# Patient Record
Sex: MALE | Race: WHITE | Employment: FULL TIME | URBAN - METROPOLITAN AREA
[De-identification: names, ages, dates, MRNs, and addresses within clinical notes are randomized per-mention and may not be internally consistent; named-entity substitution may affect disease eponyms.]

---

## 2020-09-15 ENCOUNTER — ANCILLARY PROCEDURE (OUTPATIENT)
Dept: GENERAL RADIOLOGY | Facility: CLINIC | Age: 60
End: 2020-09-15
Attending: FAMILY MEDICINE
Payer: COMMERCIAL

## 2020-09-15 ENCOUNTER — OFFICE VISIT (OUTPATIENT)
Dept: FAMILY MEDICINE | Facility: CLINIC | Age: 60
End: 2020-09-15
Payer: COMMERCIAL

## 2020-09-15 VITALS
RESPIRATION RATE: 14 BRPM | HEIGHT: 69 IN | DIASTOLIC BLOOD PRESSURE: 74 MMHG | WEIGHT: 174 LBS | SYSTOLIC BLOOD PRESSURE: 106 MMHG | BODY MASS INDEX: 25.77 KG/M2 | OXYGEN SATURATION: 98 % | HEART RATE: 59 BPM | TEMPERATURE: 96.8 F

## 2020-09-15 DIAGNOSIS — G89.29 CHRONIC RIGHT SHOULDER PAIN: ICD-10-CM

## 2020-09-15 DIAGNOSIS — Z00.00 ROUTINE GENERAL MEDICAL EXAMINATION AT A HEALTH CARE FACILITY: Primary | ICD-10-CM

## 2020-09-15 DIAGNOSIS — Z11.59 NEED FOR HEPATITIS C SCREENING TEST: ICD-10-CM

## 2020-09-15 DIAGNOSIS — Z80.42 FAMILY HISTORY OF PROSTATE CANCER: ICD-10-CM

## 2020-09-15 DIAGNOSIS — Z12.5 SCREENING FOR PROSTATE CANCER: ICD-10-CM

## 2020-09-15 DIAGNOSIS — N52.9 ERECTILE DYSFUNCTION, UNSPECIFIED ERECTILE DYSFUNCTION TYPE: ICD-10-CM

## 2020-09-15 DIAGNOSIS — Z12.11 SCREENING FOR MALIGNANT NEOPLASM OF COLON: ICD-10-CM

## 2020-09-15 DIAGNOSIS — M25.511 CHRONIC RIGHT SHOULDER PAIN: ICD-10-CM

## 2020-09-15 DIAGNOSIS — Z71.89 ADVANCED DIRECTIVES, COUNSELING/DISCUSSION: ICD-10-CM

## 2020-09-15 DIAGNOSIS — Z13.1 SCREENING FOR DIABETES MELLITUS: ICD-10-CM

## 2020-09-15 DIAGNOSIS — Z13.220 LIPID SCREENING: ICD-10-CM

## 2020-09-15 DIAGNOSIS — Z28.21 REFUSED INFLUENZA VACCINE: ICD-10-CM

## 2020-09-15 LAB
ALBUMIN SERPL-MCNC: 3.5 G/DL (ref 3.4–5)
ALP SERPL-CCNC: 67 U/L (ref 40–150)
ALT SERPL W P-5'-P-CCNC: 27 U/L (ref 0–70)
ANION GAP SERPL CALCULATED.3IONS-SCNC: 3 MMOL/L (ref 3–14)
AST SERPL W P-5'-P-CCNC: 20 U/L (ref 0–45)
BASOPHILS # BLD AUTO: 0.1 10E9/L (ref 0–0.2)
BASOPHILS NFR BLD AUTO: 0.8 %
BILIRUB SERPL-MCNC: 0.3 MG/DL (ref 0.2–1.3)
BUN SERPL-MCNC: 11 MG/DL (ref 7–30)
CALCIUM SERPL-MCNC: 9 MG/DL (ref 8.5–10.1)
CHLORIDE SERPL-SCNC: 107 MMOL/L (ref 94–109)
CHOLEST SERPL-MCNC: 169 MG/DL
CO2 SERPL-SCNC: 29 MMOL/L (ref 20–32)
CREAT SERPL-MCNC: 0.98 MG/DL (ref 0.66–1.25)
DIFFERENTIAL METHOD BLD: NORMAL
EOSINOPHIL # BLD AUTO: 0.6 10E9/L (ref 0–0.7)
EOSINOPHIL NFR BLD AUTO: 9.7 %
ERYTHROCYTE [DISTWIDTH] IN BLOOD BY AUTOMATED COUNT: 12.5 % (ref 10–15)
GFR SERPL CREATININE-BSD FRML MDRD: 83 ML/MIN/{1.73_M2}
GLUCOSE SERPL-MCNC: 108 MG/DL (ref 70–99)
HCT VFR BLD AUTO: 46.4 % (ref 40–53)
HDLC SERPL-MCNC: 54 MG/DL
HGB BLD-MCNC: 15.8 G/DL (ref 13.3–17.7)
LDLC SERPL CALC-MCNC: 99 MG/DL
LYMPHOCYTES # BLD AUTO: 1.3 10E9/L (ref 0.8–5.3)
LYMPHOCYTES NFR BLD AUTO: 21.6 %
MCH RBC QN AUTO: 32 PG (ref 26.5–33)
MCHC RBC AUTO-ENTMCNC: 34.1 G/DL (ref 31.5–36.5)
MCV RBC AUTO: 94 FL (ref 78–100)
MONOCYTES # BLD AUTO: 0.5 10E9/L (ref 0–1.3)
MONOCYTES NFR BLD AUTO: 8.9 %
NEUTROPHILS # BLD AUTO: 3.6 10E9/L (ref 1.6–8.3)
NEUTROPHILS NFR BLD AUTO: 59 %
NONHDLC SERPL-MCNC: 115 MG/DL
PLATELET # BLD AUTO: 190 10E9/L (ref 150–450)
POTASSIUM SERPL-SCNC: 4.2 MMOL/L (ref 3.4–5.3)
PROT SERPL-MCNC: 7 G/DL (ref 6.8–8.8)
PSA SERPL-ACNC: 1.28 UG/L (ref 0–4)
RBC # BLD AUTO: 4.93 10E12/L (ref 4.4–5.9)
SODIUM SERPL-SCNC: 139 MMOL/L (ref 133–144)
TRIGL SERPL-MCNC: 80 MG/DL
WBC # BLD AUTO: 6.1 10E9/L (ref 4–11)

## 2020-09-15 PROCEDURE — 99213 OFFICE O/P EST LOW 20 MIN: CPT | Mod: 25 | Performed by: FAMILY MEDICINE

## 2020-09-15 PROCEDURE — 80061 LIPID PANEL: CPT | Performed by: FAMILY MEDICINE

## 2020-09-15 PROCEDURE — G0472 HEP C SCREEN HIGH RISK/OTHER: HCPCS | Performed by: FAMILY MEDICINE

## 2020-09-15 PROCEDURE — 36415 COLL VENOUS BLD VENIPUNCTURE: CPT | Performed by: FAMILY MEDICINE

## 2020-09-15 PROCEDURE — 73030 X-RAY EXAM OF SHOULDER: CPT | Mod: RT

## 2020-09-15 PROCEDURE — G0103 PSA SCREENING: HCPCS | Performed by: FAMILY MEDICINE

## 2020-09-15 PROCEDURE — 99386 PREV VISIT NEW AGE 40-64: CPT | Performed by: FAMILY MEDICINE

## 2020-09-15 PROCEDURE — 80053 COMPREHEN METABOLIC PANEL: CPT | Performed by: FAMILY MEDICINE

## 2020-09-15 PROCEDURE — 85025 COMPLETE CBC W/AUTO DIFF WBC: CPT | Performed by: FAMILY MEDICINE

## 2020-09-15 ASSESSMENT — MIFFLIN-ST. JEOR: SCORE: 1584.64

## 2020-09-15 NOTE — PATIENT INSTRUCTIONS
You will be contacted in 1-2 days for results of your lab tests.    To schedule the MRI of the shoulder, call [phone].  Further recommendations when result is available.    Follow up in 1-2 weeks for continuing to address erectile dysfunction after all tests are done.    Get a flu shot by the end of October 2020. You deferred getting it today.    Preventative Care Visits include: Yearly physicals, Well-child visits, Welcome to Medicare visits, & Medicare yearly wellness exams.    The purpose of these visits is to discuss your medical history and prevent health problems before you are sick.  You may need to pay a copay, coinsurance or deductible if your visit today includes testing or treating for a new or existing condition.    Additional charges may be incurred for today's visit. If you have questions about what your insurance plan covers, please contact your Insurance Company's member service department.  If you have questions specific to a bill you have already received from [facility], please contact the [employer] Billing Office at [phone].     Preventive Health Recommendations  Male Ages 50 - 64    Yearly exam:             See your health care provider every year in order to  o   Review health changes.   o   Discuss preventive care.    o   Review your medicines if your doctor has prescribed any.     Have a cholesterol test every 5 years, or more frequently if you are at risk for high cholesterol/heart disease.     Have a diabetes test (fasting glucose) every three years. If you are at risk for diabetes, you should have this test more often.     Have a colonoscopy at age 50, or have a yearly FIT test (stool test). These exams will check for colon cancer.      Talk with your health care provider about whether or not a prostate cancer screening test (PSA) is right for you.    You should be tested each year for STDs (sexually transmitted diseases), if you re at risk.     Shots: Get a flu shot each year. Get  a tetanus shot every 10 years.     Nutrition:    Eat at least 5 servings of fruits and vegetables daily.     Eat whole-grain bread, whole-wheat pasta and brown rice instead of white grains and rice.     Get adequate Calcium and Vitamin D.     Lifestyle    Exercise for at least 150 minutes a week (30 minutes a day, 5 days a week). This will help you control your weight and prevent disease.     Limit alcohol to one drink per day.     No smoking.     Wear sunscreen to prevent skin cancer.     See your dentist every six months for an exam and cleaning.     See your eye doctor every 1 to 2 years.
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
## 2020-09-16 LAB — HCV AB SERPL QL IA: NONREACTIVE

## 2020-09-23 ENCOUNTER — HOSPITAL ENCOUNTER (OUTPATIENT)
Dept: MRI IMAGING | Facility: CLINIC | Age: 60
Discharge: HOME | End: 2020-09-23
Attending: FAMILY MEDICINE | Admitting: FAMILY MEDICINE
Payer: COMMERCIAL

## 2020-09-23 DIAGNOSIS — S46.811A PARTIAL TEAR OF RIGHT SUBSCAPULARIS TENDON, INITIAL ENCOUNTER: ICD-10-CM

## 2020-09-23 DIAGNOSIS — G89.29 CHRONIC RIGHT SHOULDER PAIN: ICD-10-CM

## 2020-09-23 DIAGNOSIS — M25.511 CHRONIC RIGHT SHOULDER PAIN: Primary | ICD-10-CM

## 2020-09-23 DIAGNOSIS — M19.011 DJD OF RIGHT AC (ACROMIOCLAVICULAR) JOINT: ICD-10-CM

## 2020-09-23 DIAGNOSIS — G89.29 CHRONIC RIGHT SHOULDER PAIN: Primary | ICD-10-CM

## 2020-09-23 DIAGNOSIS — M25.511 CHRONIC RIGHT SHOULDER PAIN: ICD-10-CM

## 2020-09-23 PROCEDURE — 73221 MRI JOINT UPR EXTREM W/O DYE: CPT | Mod: RT

## 2020-10-09 DIAGNOSIS — Z11.59 ENCOUNTER FOR SCREENING FOR OTHER VIRAL DISEASES: Primary | ICD-10-CM

## 2020-10-20 ENCOUNTER — OFFICE VISIT (OUTPATIENT)
Dept: ORTHOPEDICS | Facility: CLINIC | Age: 60
End: 2020-10-20
Payer: COMMERCIAL

## 2020-10-20 VITALS
DIASTOLIC BLOOD PRESSURE: 67 MMHG | HEIGHT: 69 IN | BODY MASS INDEX: 26.07 KG/M2 | WEIGHT: 176 LBS | SYSTOLIC BLOOD PRESSURE: 113 MMHG

## 2020-10-20 DIAGNOSIS — Z11.59 ENCOUNTER FOR SCREENING FOR OTHER VIRAL DISEASES: ICD-10-CM

## 2020-10-20 DIAGNOSIS — M75.81 ROTATOR CUFF TENDINITIS, RIGHT: ICD-10-CM

## 2020-10-20 DIAGNOSIS — G56.80 SCAPULOTHORACIC SYNDROME: ICD-10-CM

## 2020-10-20 DIAGNOSIS — G89.29 CHRONIC RIGHT SHOULDER PAIN: Primary | ICD-10-CM

## 2020-10-20 DIAGNOSIS — M25.511 CHRONIC RIGHT SHOULDER PAIN: Primary | ICD-10-CM

## 2020-10-20 PROCEDURE — 99243 OFF/OP CNSLTJ NEW/EST LOW 30: CPT | Performed by: FAMILY MEDICINE

## 2020-10-20 PROCEDURE — U0003 INFECTIOUS AGENT DETECTION BY NUCLEIC ACID (DNA OR RNA); SEVERE ACUTE RESPIRATORY SYNDROME CORONAVIRUS 2 (SARS-COV-2) (CORONAVIRUS DISEASE [COVID-19]), AMPLIFIED PROBE TECHNIQUE, MAKING USE OF HIGH THROUGHPUT TECHNOLOGIES AS DESCRIBED BY CMS-2020-01-R: HCPCS | Performed by: SURGERY

## 2020-10-20 ASSESSMENT — MIFFLIN-ST. JEOR: SCORE: 1593.71

## 2020-10-20 NOTE — PATIENT INSTRUCTIONS
Patient Education     Ergonomics: Adjust Your Chair  If you sit much of the day, your chair is your main support. A well-adjusted chair improves your circulation. It also helps prevent backaches and fatigue. You can increase your comfort by adjusting the chair's backrest position and height to fit your body.  Backrest    Sit at your workstation, leaning back slightly with your back firmly against the chair. The backrest should fit snugly against your lower back.    If it doesn't, adjust the backrest until your lower back is fully supported.    If you can't adjust the backrest, use a small, thin, firm pillow or rolled-up towel to support your lower back.  Chair height  Arm position    Place your fingers on the keyboard's middle row of letters. Your upper arms should hang comfortably at your sides. Your forearms should be parallel to the floor.    If they are not, adjust your chair height until your forearms are parallel to the floor.  Leg position    Keep your knees at or below the level of your hips. It may help to slide your feet forward until your knees are at a 90- to 110-degree angle. Your feet should rest firmly on the floor. There should be 1 to 2 inches of legroom between your lap and desk or keyboard tray.    If you have less than 2 inches of legroom, try to raise your desk or keyboard tray height.    If you can't adjust your chair height and your feet don't reach the floor, use something as a foot rest. A box or binder can work. If you wear flat shoes, a level surface works best. If you wear heels, a slanted surface is better.  Date Last Reviewed: 5/1/2018 2000-2019 [employer]. [address] [place] [address], [place] [address]. All rights reserved. This information is not intended as a substitute for professional medical care. Always follow your healthcare professional's instructions.           Patient Education     Ergonomics: Your Work Area    Is your workstation arranged so you can work  efficiently? That means having your monitor, keyboard, mouse, and workstation tools--such as your telephone and document chino--well placed. When they are, you'll feel better and most likely get more done.  Monitor  Screen height    Sit with your lower back supported and feet firmly on the floor or footrest. Hold your head upright and look straight at the screen. The top of your screen should be at or slightly below eye level.    If it isn't, ask someone to help you raise or lower your monitor. Place it at a viewing height that allows you to keep your head upright. (If you can't adjust your screen height, place a stand or board beneath your monitor.)  Screen distance    Measure the distance from your eyes to the screen. For most people, the screen should be 18 to 30 inches from your eyes, or at about arm's length.    If it isn't, get help moving your monitor to the desired distance.  Keyboard    Place your fingers on the keyboard's middle row of letters. Your wrists should be straight and relaxed.    If they aren't, adjust your keyboard height up or down until your wrists are straight.    If the keyboard is too low, put a pad of paper under it.    If your wrists are still not straight, readjust your chair height. Make sure your feet remain on the floor or footrest.    Wrist rests may be used to support your hands when you are not actively keying (typing).  Workstation tools    Arrange your tools so the things you use most are within easy reach. The things you don't use often can be farther away.    Place your document chino at the same height and distance as your screen.    If you use the telephone a lot, think about using a headset.  Date Last Reviewed: 1/1/2017 2000-2019 [employer]. [address] [place] [address], [place] [address]. All rights reserved. This information is not intended as a substitute for professional medical care. Always follow your healthcare professional's instructions.            Patient Education     Ergonomics: Lighting Your Work Area    Glare is the reflection off your screen that makes it hard to see the screen clearly. Glare can be caused by sunlight on your screen. Or it may be caused by indoor light, such as overhead and task lamps. Simple changes can help lessen glare and reduce strain on your eyes. Adjusting your screen's contrast and brightness can also improve viewing comfort.  Outside light  Window coverings    While sitting at your workstation, look at your screen. It should be free of glare from light coming through the windows.    If it isn't, close the blinds or pull the shades to reduce glare.    Know that you may need to adjust window coverings as the sun shifts during the day.  Indoor light  Overhead light    While sitting at your workstation, look at your screen. It should be free of glare from ceiling lights.    If it isn't, tilt or swivel the monitor so the light doesn't shine on your screen. Antiglare filters for your screen may also help.    Talk to your supervisor about other ways to reduce glare from ceiling lights.  Task light    If you use a task lamp, turn it on and look at your screen.    If it causes glare on your screen, adjust the angle of your task lamp.    Try tilting or swiveling the monitor to reduce glare. Or have someone help you move your monitor until the glare is minimized.  Light from your computer    To help improve viewing comfort, you may also need to fine-tune your monitor's contrast and brightness. Adjust both contrast and brightness. This gives you the most brightness without blurring.    Try tilting or swiveling the monitor to reduce glare. Or have someone help you move your monitor until it is at a right angle to the window.  Date Last Reviewed: 5/1/2018 2000-2019 [employer]. [address] [place] [address], [place] [address]. All rights reserved. This information is not intended as a substitute for professional medical care.  Always follow your healthcare professional's instructions.           Patient Education     Reducing Risk of Musculoskeletal Disorders: Posture at Your Workstation  Proper posture reduces strain on soft tissues. When you're in neutral position, your bone structure supports you and provides a stable base to move from. As a result, your movements carry more power, and muscles and tendons don't need to work overtime just to keep you upright. To stay close to neutral, try the tips below.  Face your work. If you need to change direction, move your whole body instead of twisting.  Position yourself so you don't have to stretch or slouch to reach your materials. You should be able to move your forearms straight out from your body to work.  Grasp with your whole hand instead of with just your fingers.  When seated, keep your feet flat on the floor or on a foot support. When standing, put a foot up on a ledge or stool to take pressure off your back.  Clear away clutter between you and your work.  Wait for items on an assembly line to reach you. Don't stretch to meet them.  Use task lighting so you don't have to lean over to see your work.  Use a magnifying device to protect both your eyes and your posture if you work with small items.  Tilt the angle of your work, not your head and neck.  Keep your wrists as straight as possible. Avoid twisting your wrists too far to either side or too far up or down.  Date Last Reviewed: 6/1/2018 2000-2019 [employer]. [address] [place] [address]. All rights reserved. This information is not intended as a substitute for professional medical care. Always follow your healthcare professional's instructions.           Patient Education     Exercises at Your Workstation: Eyes, Neck, and Head  Tired eyes? Stiff neck? A few easy moves can help prevent these kinds of problems. Take a few minutes during your day to do these exercises--right at your desk. They'll loosen  up your muscles, keep you more alert, and make a big difference in how you work and feel.  Breathe deeply as you do your exercises. Inhale through your nose, and exhale through your mouth.    For your eyes  Eye cup    Lean forward with your elbows on your desk.    Cup your hands and place them lightly over your closed eyes. Hold for a minute, while breathing deeply in and out.    Slowly uncover and open your eyes. Repeat 2 times.  Eye roll    Close your eyes. Slowly roll your eyeballs clockwise all the way around. Repeat 3 times.    Now slowly roll them all the way around counterclockwise. Repeat 3 times.  Eye rest    Every 20 minutes, look away from the computer screen. Focus on an object at least 20 feet away. Stay focused on this object for a full 20 seconds.    For your neck and head  Warm-up    Drop your head gently to your chest. While breathing in, slowly roll your head up to your left shoulder. While breathing out, slowly roll your head back to center. Repeat to the right.    Repeat 3 times on each side.  Head tilt    Sit up straight. Tuck in your chin.    Slowly tip your head to the left. Return to the center. Then, tip your head to the right.    Repeat 3 times on each side.  Head turn    Sit up straight.    Slowly turn your head and look over your left shoulder. Hold for a few seconds. Go back to the center, then repeat to your right.    Repeat 3 times on each side.  If you feel pain while doing these stretching exercises, please stop and consult your healthcare provider.  Date Last Reviewed: 5/1/2018 2000-2019 [employer]. [address] [place] [address], [place] [address]. All rights reserved. This information is not intended as a substitute for professional medical care. Always follow your healthcare professional's instructions.           Patient Education     Ergonomics: Does Your Workstation Fit You?  You may not know it, but working at your computer can take a toll on your body. It can cause  sore muscles, headaches, eyestrain, tension, and fatigue. But ergonomics can help. It's the science of arranging your workstation to fit you and your body. Read on to find out how ergonomics can help solve problems you may be having.  If your eyes are really tired at the end of the day, adjusting your monitor or lighting may bring relief.  If your neck and shoulders are often stiff and sore, your chair height, monitor, or keyboard may need adjusting.  If you ever feel pain or discomfort in your back while working at your computer, adjusting your backrest or sitting posture may take strain off your back.  If you feel tingling, numbness, or pain in your forearms, wrists, or hands, your chair height or keyboard may need adjusting.  If your body feels tired, achy, or stiff at the end of the day, you may need to take stretch breaks more often.  If your legs are often stiff and cramped, or you have swelling and numbness in your ankles and feet, your chair might not be at the best height for you. Or you may need a footrest.  Date Last Reviewed: 5/1/2018 2000-2019 [employer]. [address] [place] [address]. All rights reserved. This information is not intended as a substitute for professional medical care. Always follow your healthcare professional's instructions.           Patient Education     Exercises at Your Workstation: Shoulders  Tight shoulders? Aching back? A few easy moves can help your shoulders and back feel better. Take a few minutes during your day to do these exercises, right at your desk. They'll loosen up your muscles, keep you more alert, and make a big difference in how you work and feel.  Breathe deeply as you do your exercises. Inhale through your nose, and exhale through your mouth.  For your shoulders  Warm-up    Drop your head gently to your chest. While breathing in, slowly roll your head up to your left shoulder. While breathing out, slowly roll your head back to  center. Repeat to the right.    Repeat 3 times on each side.  Shoulder raise    Slowly raise your shoulders toward your ears. Hold for a few seconds.    Slowly bring your shoulders down and relax.    Repeat 3 times.  Back press    Put your hands up, forearms raised.    Push your arms back, squeezing your shoulder blades. Hold for a few seconds, then relax.    Repeat 3 times.    If you feel pain while doing these stretching exercises, please stop and consult your healthcare provider.  Date Last Reviewed: 5/1/2018 2000-2019 [employer]. [address] [place] [address]. All rights reserved. This information is not intended as a substitute for professional medical care. Always follow your healthcare professional's instructions.
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
## 2020-10-21 ENCOUNTER — ANESTHESIA EVENT (OUTPATIENT)
Dept: GASTROENTEROLOGY | Facility: CLINIC | Age: 60
End: 2020-10-21
Payer: COMMERCIAL

## 2020-10-21 ENCOUNTER — OFFICE VISIT (OUTPATIENT)
Dept: FAMILY MEDICINE | Facility: CLINIC | Age: 60
End: 2020-10-21
Payer: COMMERCIAL

## 2020-10-21 VITALS
OXYGEN SATURATION: 98 % | WEIGHT: 179.2 LBS | RESPIRATION RATE: 16 BRPM | HEART RATE: 68 BPM | SYSTOLIC BLOOD PRESSURE: 114 MMHG | HEIGHT: 69 IN | TEMPERATURE: 96.8 F | DIASTOLIC BLOOD PRESSURE: 74 MMHG | BODY MASS INDEX: 26.54 KG/M2

## 2020-10-21 DIAGNOSIS — N52.9 ERECTILE DYSFUNCTION, UNSPECIFIED ERECTILE DYSFUNCTION TYPE: Primary | ICD-10-CM

## 2020-10-21 DIAGNOSIS — B00.1 RECURRENT COLD SORES: ICD-10-CM

## 2020-10-21 DIAGNOSIS — Z11.3 SCREEN FOR STD (SEXUALLY TRANSMITTED DISEASE): ICD-10-CM

## 2020-10-21 LAB
GLUCOSE SERPL-MCNC: 92 MG/DL (ref 70–99)
SARS-COV-2 RNA SPEC QL NAA+PROBE: NOT DETECTED
SPECIMEN SOURCE: NORMAL

## 2020-10-21 PROCEDURE — 36415 COLL VENOUS BLD VENIPUNCTURE: CPT | Performed by: FAMILY MEDICINE

## 2020-10-21 PROCEDURE — 84403 ASSAY OF TOTAL TESTOSTERONE: CPT | Mod: 90 | Performed by: FAMILY MEDICINE

## 2020-10-21 PROCEDURE — 84270 ASSAY OF SEX HORMONE GLOBUL: CPT | Performed by: FAMILY MEDICINE

## 2020-10-21 PROCEDURE — 87591 N.GONORRHOEAE DNA AMP PROB: CPT | Performed by: FAMILY MEDICINE

## 2020-10-21 PROCEDURE — 86780 TREPONEMA PALLIDUM: CPT | Mod: 90 | Performed by: FAMILY MEDICINE

## 2020-10-21 PROCEDURE — 87340 HEPATITIS B SURFACE AG IA: CPT | Performed by: FAMILY MEDICINE

## 2020-10-21 PROCEDURE — 99214 OFFICE O/P EST MOD 30 MIN: CPT | Performed by: FAMILY MEDICINE

## 2020-10-21 PROCEDURE — 87491 CHLMYD TRACH DNA AMP PROBE: CPT | Performed by: FAMILY MEDICINE

## 2020-10-21 PROCEDURE — 99000 SPECIMEN HANDLING OFFICE-LAB: CPT | Performed by: FAMILY MEDICINE

## 2020-10-21 PROCEDURE — 82947 ASSAY GLUCOSE BLOOD QUANT: CPT | Performed by: FAMILY MEDICINE

## 2020-10-21 PROCEDURE — 87389 HIV-1 AG W/HIV-1&-2 AB AG IA: CPT | Performed by: FAMILY MEDICINE

## 2020-10-21 RX ORDER — VALACYCLOVIR HYDROCHLORIDE 1 G/1
2000 TABLET, FILM COATED ORAL 2 TIMES DAILY
Qty: 4 TABLET | Refills: 11 | Status: SHIPPED | OUTPATIENT
Start: 2020-10-21 | End: 2022-03-17

## 2020-10-21 RX ORDER — SILDENAFIL CITRATE 20 MG/1
20-40 TABLET ORAL DAILY PRN
Qty: 30 TABLET | Refills: 1 | Status: SHIPPED | OUTPATIENT
Start: 2020-10-21 | End: 2021-06-03

## 2020-10-21 ASSESSMENT — MIFFLIN-ST. JEOR: SCORE: 1608.23

## 2020-10-21 NOTE — PATIENT INSTRUCTIONS
Thank you for choosing [facility].  You may be receiving an email and/or telephone survey request from [facility] Customer Experience regarding your visit today.  Please take a few minutes to respond to the survey to let us know how we are doing.      If you have questions or concerns, please contact us via [employer] or you can contact your care team at [phone].    Our Clinic hours are:  Monday 6:40 am  to 7:00 pm  Tuesday -Friday 6:40 am to 5:00 pm    The [place] outpatient lab hours are:  Monday - Friday 6:10 am to 4:45 pm  Saturdays 7:00 am to 11:00 am  Appointments are required, call [phone]    If you have clinical questions after hours or would like to schedule an appointment,  call the clinic at [phone].    Patient Education     Oral Medicines for Erectile Dysfunction  You can use prescription oral medicine for ED. They often work well. But, like all medicines, they can have side effects. Also, you can t use them if you have certain health problems or take certain other medicines. Talk with your doctor about oral ED medicine. Ask whether it is right for you.  Types of oral ED medicines  There are three types of prescription oral ED medicines. Each one increases blood flow to the penis. When the penis is stimulated, an erection results. The types are:    Sildenafil citrate     Tadalafil     Vardenafil    Avanfil  What oral ED medicines don t do  There are some things ED medicines can t do.    They don t cure the cause of your ED. Without the medicine, you ll still have trouble getting an erection.    They can t produce an erection without sexual stimulation.    They won t increase sexual desire. They also won t solve any other sexual issues. Psychological, emotional, or relationship issues will not be fixed.  Taking oral ED medicines safely    Do not combine ED medicines with other treatments unless your doctor tells you to.    Don t take ED medicines more often or in larger doses than  prescribed.    Tell your doctor your health history. Mention all medicines you take. This includes over-the-counter drugs, supplements, and herbs.    Ask your doctor about whether you can drink alcohol while taking ED medication.  Possible side effects of oral ED medicines    Headache    Facial flushing    Runny or stuffy nose    Indigestion    Distortion of your color vision for a short time    Sudden vision loss or hearing loss (rare)    Dizziness  Risks of oral ED medicines    Do not take ED medicines if you take medicines containing nitrates. The combination may drop your blood pressure to a dangerous level. Nitrates include nitroglycerin (a drug for angina or chest pain). They are also in  poppers,  an inhaled recreational drug. If you re not sure whether you re taking nitrates, ask your doctor or pharmacist.    Medicines called alpha-blockers can interact with ED medicines. They can cause a sudden drop in blood pressure. Alpha-blockers are a common treatment for prostate problems. They also treat high blood pressure. Be sure your doctor knows if you take these medicines.    If you ve had a heart attack or have heart disease and you have not had sex for a while, talk to your doctor. Having sex again can put extra strain on your heart. Your doctor can confirm that your heart is healthy enough for sex.    It is rare, but some men taking ED medicines have had sudden vision loss. This may be more likely if other health problems are present. These include high blood pressure and diabetes. Ask your doctor if you are at risk for this type of vision loss.    In rare cases, an erection may last too long. This can badly damage the blood vessels in your penis. If an erection lasts longer than 4 hours, go to the emergency room right away.   Date Last Reviewed: 1/1/2017 2000-2019 [employer]. [address] [place] [address], [place] [address]. All rights reserved. This information is not intended as a substitute for  professional medical care. Always follow your healthcare professional's instructions.           Patient Education     Erectile Dysfunction: Rebuilding Intimacy     Man and woman sitting together on couch, smiling.     Being intimate means being close as a couple, with sex as just one part of intimacy. A hug, a kind remark, or a gift can be very romantic, even if sex doesn t follow. So renew your intimacy along with your sex life. Learn to talk with, and listen to, your partner. And remember that your value as a man goes beyond what you do in bed.  Tips for intimacy  As you and your partner become closer to each other, you might find that you can enjoy sex more.    Show and tell your partner what you like. If you don t, your partner might not know what you want.    Ask your partner to show you how he or she wants to be touched.    Be patient. Take your time. Relax. Give yourselves a chance to become aroused.    Try being intimate without intercourse. Instead, exchange back rubs. Or try kissing, or just a soft touch.    Focus on what you and your partner like about each other. This could be a certain laugh or smile, or other joys you share together.  Tips for talking  It s OK to be shy when you talk about sex with your partner. But talking gets easier with practice. Use these tips when you talk with each other.    Choose a time and place when you re both relaxed and comfortable.    Listen to your partner. Try repeating back what you think the other has said. This will help show if you ve understood each other.    Don t  what your partner says. Talking feels safer if you don t criticize each other.    Don t be defensive. You may not like something your partner says. But you can still thank your partner for being honest.    Think about meeting with a counselor. They re trained to help couples who are being treated for ED.  Date Last Reviewed: 1/1/2017 2000-2019 [employer]. [address] [facility],  [place] [address]. All rights reserved. This information is not intended as a substitute for professional medical care. Always follow your healthcare professional's instructions.           Patient Education     Understanding Cold Sores  Cold sores, which are also called fever blisters, are small blisters or sores on the lip or sometimes inside the mouth. Many people get them from time to time. Cold sores usually are not serious, and they usually heal in a few days, sometimes longer. They are caused by 2 related viruses, herpes simplex type 1 and 2. These viruses spread very easily. Many people have one or both of these viruses in their body. More than 4 in every 5 people are infected with herpes simplex type 1 and this is the most common cause of cold sores. Once you have the virus that causes cold sores, it stays in your body for the rest of your life. But it can be inactive for long periods.  What causes a cold sore?  Cold sores are usually caused by herpes simplex virus type 1. Less often, they are caused by herpes simplex virus type 2. Herpes simplex virus type 2 is the more common cause of genital sores. The herpes viruses can enter the body through a break in the skin such as a scrape. Or they may enter through mucous membranes such as the lips or mouth. Some ways to get the viruses include:    Kissing someone who has a cold sore    Sharing a drinking glass, eating utensils, or lip balm with someone who has a cold sore    Having sex with someone who has a cold sore  A  baby can also get the infection at birth.  If you have a herpes virus, you can to pass it along even when you don t have a sore.  Cold sores flare up occasionally. Things that can cause an outbreak include:    Sun exposure    Fever    Stress or exhaustion    Menstruation    Skin irritation    Another unrelated Illness such as pneumonia, urinary infection, or cancer  What are the symptoms of a cold sore?  Symptoms can include:    A  blister-like sore or cluster of sores. These often occur at the edge of the lips but may appear inside the mouth.    Skin redness around the sores.    Pain or itching in the area of the outbreak. Often the pain or itching develops 12 to 24 hours before the sore become visible.    Flu-like symptoms, including swollen glands, headache, body ache, or fever. These typically occur only at the time of the first infection.  Cold sores may also occur on fingers. They may rarely infect the eyes, a serious possible complication.  Some people have symptoms a day or two before an outbreak. They may feel tenderness, burning, itching, or tingling before a cold sore appears. Cold sores tend to come back in the same area that they first appeared.  How are cold sores treated?  Treatment for cold sores focuses on relieving and shortening symptoms. For people with frequent outbreaks, treatment works to decrease how often and how symptomatic future episodes will be.  Treatments may include:    Prescription or over-the-counter pain medicines. These can help with discomfort, especially if sores are inside the mouth.    Antiviral medicines. These may be pills that are taken by mouth or a cream to apply to sores. They may help shorten an outbreak and reduce the severity of symptoms.  They may be used to help prevent future outbreaks if you have bothersome recurrent infections.    Self-care such as extra rest and drinking more fluids. These may help relieve the flu-like symptoms of a first outbreak.  How are cold sores diagnosed?  Your healthcare provider makes the diagnosis mainly by looking at the sores and doing a clinical exam.  This may be confirmed by swab tests or blood tests.  How can I prevent cold sores?  You can help reduce the spread of the herpes viruses that cause cold sores. This can help both you and others avoid getting cold sores. Follow these tips:    Do not kiss others if you have a cold sore. Also avoid kissing someone  with a cold sore.    Do not share eating utensils, lip balm, razors, or towels with someone who has a cold sore.    Wash your hands after touching the area of a cold sore. The herpes virus can be carried from your face to your hands when you touch the area of a cold sore. When this happens, wash your hands thoroughly, for at least 20 seconds. When you can t wash with soap and water, use an alcohol-based hand .    Disinfect things you touch often, such as phones and keyboards.      If you feel a cold sore coming on, do the same things you would do when a cold sore is present to avoid spreading the virus.    Use condoms to help prevent passing on the viruses through sex.  What are the possible complications of a cold sore?  Cold sores usually go away by themselves within a few days, occasionally 1-2 weeks or longer. For most people cold sores are not serious. The viruses that cause cold sores can cause more serious illness, though. People who have a weak immune system may get more serious infections from herpes viruses. These include people being treated for cancer or who have HIV disease. Babies may also become very ill from a herpes infection.   When should I call my healthcare provider?  Call your healthcare provider right away if you have any of these:    Fever of 100.4 F (38 C) or higher, or as directed    Pain that gets worse    You cannot eat or drink because of painful sores    Symptoms don t get better within 5 to 7 days    Blisters spread beyond the mouth or lip to areas on the chest, arms, face (especially the eyes), or legs  Date Last Reviewed: 3/28/2016    2538-7257 [employer]. [address] [place] [address], [place] [address]. All rights reserved. This information is not intended as a substitute for professional medical care. Always follow your healthcare professional's instructions.

## 2020-10-21 NOTE — PROGRESS NOTES
[facility] Note     CC: fu labs and cough     S: Toribio Diop is a 43year old female who presents today for f/u on lab results and cough, runny nose    Pt states she was traveling and on a cruise last week. Developed a runny nose congestion, sneezing and a mild non productive cough. States symptoms have much improved with mucinex however cough and runny nose still present   Non productive no blood. No fevers, chills chest pain, sob, n/v   Otherwise feels well. No hx of smoking    Also here to review recent blood work 8.3  hgb 9.0 - pt states she has a hx of heavy menstrual cycles with cysts and fibroids has been following up with ob/gyn last seen earlier this year  Hx of irregular menstraul cycles (hx of pcos)  S/p lap myomectomy and D&C  Offered bc however had declined  States past 2 month menstrual cycles are better. LMP 2 weeks ago    No dizziness lightheadness syncope or presyncope no hx of black stools or blood in stools  No family hx of colon ca  Started taking iron supplements but is not consistent    ROS:   Eye Problem(s):negative  ENT Problem(s)as above  Cardiovascular problem(s) negative   Respiratory problem(s):as above  Gastro-intestinal problem(s):negative   Genito-urinary problem(s):negative   Musculoskeletal problem(s)negative   Integumentary problem(s):negative   Neurological problem(s):negative   Psychiatric problem(s)negative   Endocrine problem(s):negative   Hematologic and/or Lymphatic problem(s):as above     Current Outpatient Medications   Medication Sig Dispense Refill   â¢ ferrous sulfate 325 (65 FE) MG EC tablet Take 1 tablet by mouth 3 times daily (with meals). â¢ Vitamin D, Ergocalciferol, 43864 units capsule Take 1 capsule by mouth 1 day a week. 12 capsule 1     No current facility-administered medications for this visit.         Past Medical History:   Diagnosis Date   â¢ Dysmenorrhea 2/17/2017    Dilatation And Curettage   â¢ Fibroid 2017 Subjective     Yayo Culver is a 60 year old male who presents to clinic today for the following health issues:    HPI         Chief Complaint   Patient presents with     Erectile Dysfunction     Follow up from 9/15/20 visit.  Discuss lab results.     Mouth Lesions     has history of cold sores, occurs about 1 times per month, takes acyclovir for this, would like an rx for this     STD     Would like screening testing for std, no known exposures.     Flu Shot     declined     Patient had Covid-19 PCR test yesterday for pre-procedure screening. Asymptomatic up to today.    Patient would like asymptomatic STI screening.    Patient reports persistent erectile dysfunction as discussed last visit. He would like to try med treatment.  Reviewed his lab test results.  Patient denies any new genitourinary symptoms.    Patient reports hx of recurrent cold sores usually at the left corner of the mouth/lips. Gets this about once a month, not more often than that.  Used to be given acyclovir for episodic treatment but has not had Rx recently.      Patient Active Problem List   Diagnosis     Chronic right shoulder pain     Refused influenza vaccine     Advanced directives, counseling/discussion     Erectile dysfunction, unspecified erectile dysfunction type     Family history of prostate cancer     History reviewed. No pertinent surgical history.    Social History     Tobacco Use     Smoking status: Never Smoker     Smokeless tobacco: Never Used   Substance Use Topics     Alcohol use: Yes     Comment: rare     Family History   Problem Relation Age of Onset     Heart Disease Mother         a-fib     Kidney failure Mother      Heart Disease Father          Current Outpatient Medications   Medication Sig Dispense Refill     sildenafil (REVATIO) 20 MG tablet Take 1-2 tablets (20-40 mg) by mouth daily as needed (erectile dysfunction) 30 tablet 1     valACYclovir (VALTREX) 1000 mg tablet Take 2 tablets (2,000 mg) by mouth 2 times  "daily for 1 day 4 tablet 11     No Known Allergies      Review of Systems   Constitutional, HEENT, cardiovascular, pulmonary, GI, , musculoskeletal, neuro, skin, endocrine and psych systems are negative, except as otherwise noted.      Objective    /74 (BP Location: Left arm, Patient Position: Chair, Cuff Size: Adult Regular)   Pulse 68   Temp 96.8  F (36  C) (Tympanic)   Resp 16   Ht 1.753 m (5' 9\")   Wt 81.3 kg (179 lb 3.2 oz)   SpO2 98%   BMI 26.46 kg/m    Body mass index is 26.46 kg/m .  Physical Exam   GEN: alert, oriented x 3, NAD, ambulatory w/o assist  EYES; no icterus  PSYCH: normal mood and affect    Rest of exam deferred as patient niki had full physical last week.    Results for orders placed or performed in visit on 10/21/20 (from the past 24 hour(s))   Glucose   Result Value Ref Range    Glucose 92 70 - 99 mg/dL           Assessment & Plan     Yayo was seen today for erectile dysfunction, mouth lesions, std and flu shot.    Diagnoses and all orders for this visit:    Erectile dysfunction, unspecified erectile dysfunction type  -     sildenafil (REVATIO) 20 MG tablet; Take 1-2 tablets (20-40 mg) by mouth daily as needed (erectile dysfunction)  -     Testosterone Free and Total  Discussed with patient possible causes of erectile dysfunction: psych, prostate disease, vascular disease, metabolic disease, obesity causing decreased testosterone response.  Discussed possible treatments.  Patient would like to try sildenafil.  Discussed possible side effects of meds; patient to inform clinic if they occur.    Screen for STD (sexually transmitted disease)  -     HIV Antigen Antibody Combo  -     Neisseria gonorrhoeae PCR  -     Glucose  -     Hepatitis B surface antigen  -     Treponema Abs w Reflex to RPR and Titer  -     Chlamydia trachomatis PCR    Recurrent cold sores  -     valACYclovir (VALTREX) 1000 mg tablet; Take 2 tablets (2,000 mg) by mouth 2 times daily for 1 day  Advised options for " "fibroids dx on laparoscopy for pelvic pain, s/p myomectomy       Social History     Socioeconomic History   â¢ Marital status: Single     Spouse name: Not on file   â¢ Number of children: Not on file   â¢ Years of education: Not on file   â¢ Highest education level: Not on file   Occupational History   â¢ Not on file   Social Needs   â¢ Financial resource strain: Not on file   â¢ Food insecurity:     Worry: Not on file     Inability: Not on file   â¢ Transportation needs:     Medical: Not on file     Non-medical: Not on file   Tobacco Use   â¢ Smoking status: Current Every Day Smoker     Packs/day: 0.25     Years: 1.00     Pack years: 0.25     Start date: 2017   â¢ Smokeless tobacco: Never Used   Substance and Sexual Activity   â¢ Alcohol use:  Yes     Alcohol/week: 2.0 standard drinks     Types: 2 Standard drinks or equivalent per week   â¢ Drug use: No   â¢ Sexual activity: Not Currently     Partners: Male     Birth control/protection: None   Lifestyle   â¢ Physical activity:     Days per week: 0 days     Minutes per session: 0 min   â¢ Stress: Not on file   Relationships   â¢ Social connections:     Talks on phone: Not on file     Gets together: Not on file     Attends Restorationism service: Not on file     Active member of club or organization: Not on file     Attends meetings of clubs or organizations: Not on file     Relationship status: Not on file   â¢ Intimate partner violence:     Fear of current or ex partner: Not on file     Emotionally abused: Not on file     Physically abused: Not on file     Forced sexual activity: Not on file   Other Topics Concern   â¢ Not on file   Social History Narrative   â¢ Not on file       Family History   Problem Relation Age of Onset   â¢ Clotting Disorder Neg Hx        O:  Visit Vitals  /78 (BP Location: [facility], Patient Position: Sitting, Cuff Size: Regular)   Pulse 74   Temp 98.9 Â°F (37.2 Â°C) (Tympanic)   Resp 18   Ht 5' 2"" (1.575 m)   Wt 92.5 kg (203 lb 14.4 oz)   BMI 37.29 kg/mÂ²     Gen: No " Acute Distress  HEENT: NCAT, boggy nasal turbinates  PERRLA, EOMI, MMM, neck supple, no masses, no LAD   Heart: RRR, nl S1/S2, no murmurs/rubs/gallops   Lung:Symmetrical expansion, CTAB, no wheezes/rales/ronchi  Abdomen: +BS, soft, nt/nd, no masses, no guarding/rebounding  Skin: No rashes,  Neuro:Normal gait.      Labs:     WBC (K/mcL)   Date Value   08/24/2019 7.5     RBC (mil/mcL)   Date Value   08/24/2019 4.94     HCT (%)   Date Value   08/24/2019 37.6     HGB (g/dL)   Date Value   08/24/2019 11.0 (L)     PLT (K/mcL)   Date Value   08/24/2019 332       Sodium (mmol/L)   Date Value   08/03/2019 142     Potassium (mmol/L)   Date Value   08/03/2019 4.4     Chloride (mmol/L)   Date Value   08/03/2019 110 (H)     Carbon Dioxide (mmol/L)   Date Value   08/03/2019 24     BUN (mg/dL)   Date Value   08/03/2019 14     Creatinine (mg/dL)   Date Value   08/03/2019 0.54       Hemoglobin A1C (%)   Date Value   08/03/2019 5.3       CHOLESTEROL (mg/dL)   Date Value   08/03/2019 193     HDL (mg/dL)   Date Value   08/03/2019 42 (L)     CHOL/HDL (no units)   Date Value   08/03/2019 4.6 (H)     TRIGLYCERIDE (mg/dL)   Date Value   08/03/2019 144     CALCULATED LDL (mg/dL)   Date Value   08/03/2019 122       TSH (mcUnits/mL)   Date Value   08/03/2019 0.584       VITAMIND, 25 HYDROXY (ng/mL)   Date Value   08/03/2019 54.6       A/P: Jules Comment is a 43year old female who presents today for f/u on lab results and cough, runny nose    (J06.9,  B97.89) Viral URI with cough  (primary encounter diagnosis)  Comment: afebrile lungs clear  Plan:   - recommend mucinex, flonase nasal spray  - warm salt water gargles  - steam/humidified, air, netti pot  - tylenol or motrin prn for fevers/discomfort  - stay well hydrated  - discussed all warning signs and indications to return, can consider urine legion / cxr    (D64.9) Anemia, unspecified type  (N92.1) Menorrhagia with irregular cycle  Comment: hx of cysts and fibroids s/p d& c in the past; treatment.  Patient concurred with the above.  Consider chronic suppressive therapy if more frequent breakouts.          Patient Instructions           Thank you for choosing [facility].  You may be receiving an email and/or telephone survey request from [facility] [employer] regarding your visit today.  Please take a few minutes to respond to the survey to let us know how we are doing.      If you have questions or concerns, please contact us via [employer] or you can contact your care team at [phone].    Our Clinic hours are:  Monday 6:40 am  to 7:00 pm  Tuesday -Friday 6:40 am to 5:00 pm    The [place] outpatient lab hours are:  Monday - Friday 6:10 am to 4:45 pm  Saturdays 7:00 am to 11:00 am  Appointments are required, call [phone]    If you have clinical questions after hours or would like to schedule an appointment,  call the clinic at [phone].    Patient Education     Oral Medicines for Erectile Dysfunction  You can use prescription oral medicine for ED. They often work well. But, like all medicines, they can have side effects. Also, you can t use them if you have certain health problems or take certain other medicines. Talk with your doctor about oral ED medicine. Ask whether it is right for you.  Types of oral ED medicines  There are three types of prescription oral ED medicines. Each one increases blood flow to the penis. When the penis is stimulated, an erection results. The types are:    Sildenafil citrate     Tadalafil     Vardenafil    Avanfil  What oral ED medicines don t do  There are some things ED medicines can t do.    They don t cure the cause of your ED. Without the medicine, you ll still have trouble getting an erection.    They can t produce an erection without sexual stimulation.    They won t increase sexual desire. They also won t solve any other sexual issues. Psychological, emotional, or relationship issues will not be fixed.  Taking oral ED medicines  hgb 9.0 asymptomatic denies any black stools or blood in stools no fam hx ; discussed likely etiology however will check stool as well  Plan  - repeat cbc, iron studies, vitamin b12 folate  - stool test  - ferrous sulfate tid / iron rich foods  - f/u with obgyn ; bc in the past declined  - warning signs discussed  Instructions provided as documented in the Visit Summary. Medical compliance with plan discussed and risk of noncompliance reviewed. Patient education completed on disease process, etiology and prognosis. Return to clinic as clinically indicated was discussed with patient who verbalized understanding of and agreement with the plan. Proper usage and all side effects of medications reviewed with patient who expressed understanding.      Damien Logan MD  Family Medicine Physician safely    Do not combine ED medicines with other treatments unless your doctor tells you to.    Don t take ED medicines more often or in larger doses than prescribed.    Tell your doctor your health history. Mention all medicines you take. This includes over-the-counter drugs, supplements, and herbs.    Ask your doctor about whether you can drink alcohol while taking ED medication.  Possible side effects of oral ED medicines    Headache    Facial flushing    Runny or stuffy nose    Indigestion    Distortion of your color vision for a short time    Sudden vision loss or hearing loss (rare)    Dizziness  Risks of oral ED medicines    Do not take ED medicines if you take medicines containing nitrates. The combination may drop your blood pressure to a dangerous level. Nitrates include nitroglycerin (a drug for angina or chest pain). They are also in  poppers,  an inhaled recreational drug. If you re not sure whether you re taking nitrates, ask your doctor or pharmacist.    Medicines called alpha-blockers can interact with ED medicines. They can cause a sudden drop in blood pressure. Alpha-blockers are a common treatment for prostate problems. They also treat high blood pressure. Be sure your doctor knows if you take these medicines.    If you ve had a heart attack or have heart disease and you have not had sex for a while, talk to your doctor. Having sex again can put extra strain on your heart. Your doctor can confirm that your heart is healthy enough for sex.    It is rare, but some men taking ED medicines have had sudden vision loss. This may be more likely if other health problems are present. These include high blood pressure and diabetes. Ask your doctor if you are at risk for this type of vision loss.    In rare cases, an erection may last too long. This can badly damage the blood vessels in your penis. If an erection lasts longer than 4 hours, go to the emergency room right away.   Date Last Reviewed:  1/1/2017 2000-2019 [employer]. [address] [place] [address], [place] [address]. All rights reserved. This information is not intended as a substitute for professional medical care. Always follow your healthcare professional's instructions.           Patient Education     Erectile Dysfunction: Rebuilding Intimacy     Man and woman sitting together on couch, smiling.     Being intimate means being close as a couple, with sex as just one part of intimacy. A hug, a kind remark, or a gift can be very romantic, even if sex doesn t follow. So renew your intimacy along with your sex life. Learn to talk with, and listen to, your partner. And remember that your value as a man goes beyond what you do in bed.  Tips for intimacy  As you and your partner become closer to each other, you might find that you can enjoy sex more.    Show and tell your partner what you like. If you don t, your partner might not know what you want.    Ask your partner to show you how he or she wants to be touched.    Be patient. Take your time. Relax. Give yourselves a chance to become aroused.    Try being intimate without intercourse. Instead, exchange back rubs. Or try kissing, or just a soft touch.    Focus on what you and your partner like about each other. This could be a certain laugh or smile, or other joys you share together.  Tips for talking  It s OK to be shy when you talk about sex with your partner. But talking gets easier with practice. Use these tips when you talk with each other.    Choose a time and place when you re both relaxed and comfortable.    Listen to your partner. Try repeating back what you think the other has said. This will help show if you ve understood each other.    Don t  what your partner says. Talking feels safer if you don t criticize each other.    Don t be defensive. You may not like something your partner says. But you can still thank your partner for being honest.    Think about meeting with a  counselor. They re trained to help couples who are being treated for ED.  Date Last Reviewed: 2017-2019 [employer]. [address] [place] [address], [place] [address]. All rights reserved. This information is not intended as a substitute for professional medical care. Always follow your healthcare professional's instructions.           Patient Education     Understanding Cold Sores  Cold sores, which are also called fever blisters, are small blisters or sores on the lip or sometimes inside the mouth. Many people get them from time to time. Cold sores usually are not serious, and they usually heal in a few days, sometimes longer. They are caused by 2 related viruses, herpes simplex type 1 and 2. These viruses spread very easily. Many people have one or both of these viruses in their body. More than 4 in every 5 people are infected with herpes simplex type 1 and this is the most common cause of cold sores. Once you have the virus that causes cold sores, it stays in your body for the rest of your life. But it can be inactive for long periods.  What causes a cold sore?  Cold sores are usually caused by herpes simplex virus type 1. Less often, they are caused by herpes simplex virus type 2. Herpes simplex virus type 2 is the more common cause of genital sores. The herpes viruses can enter the body through a break in the skin such as a scrape. Or they may enter through mucous membranes such as the lips or mouth. Some ways to get the viruses include:    Kissing someone who has a cold sore    Sharing a drinking glass, eating utensils, or lip balm with someone who has a cold sore    Having sex with someone who has a cold sore  A  baby can also get the infection at birth.  If you have a herpes virus, you can to pass it along even when you don t have a sore.  Cold sores flare up occasionally. Things that can cause an outbreak include:    Sun exposure    Fever    Stress or  exhaustion    Menstruation    Skin irritation    Another unrelated Illness such as pneumonia, urinary infection, or cancer  What are the symptoms of a cold sore?  Symptoms can include:    A blister-like sore or cluster of sores. These often occur at the edge of the lips but may appear inside the mouth.    Skin redness around the sores.    Pain or itching in the area of the outbreak. Often the pain or itching develops 12 to 24 hours before the sore become visible.    Flu-like symptoms, including swollen glands, headache, body ache, or fever. These typically occur only at the time of the first infection.  Cold sores may also occur on fingers. They may rarely infect the eyes, a serious possible complication.  Some people have symptoms a day or two before an outbreak. They may feel tenderness, burning, itching, or tingling before a cold sore appears. Cold sores tend to come back in the same area that they first appeared.  How are cold sores treated?  Treatment for cold sores focuses on relieving and shortening symptoms. For people with frequent outbreaks, treatment works to decrease how often and how symptomatic future episodes will be.  Treatments may include:    Prescription or over-the-counter pain medicines. These can help with discomfort, especially if sores are inside the mouth.    Antiviral medicines. These may be pills that are taken by mouth or a cream to apply to sores. They may help shorten an outbreak and reduce the severity of symptoms.  They may be used to help prevent future outbreaks if you have bothersome recurrent infections.    Self-care such as extra rest and drinking more fluids. These may help relieve the flu-like symptoms of a first outbreak.  How are cold sores diagnosed?  Your healthcare provider makes the diagnosis mainly by looking at the sores and doing a clinical exam.  This may be confirmed by swab tests or blood tests.  How can I prevent cold sores?  You can help reduce the spread of the  herpes viruses that cause cold sores. This can help both you and others avoid getting cold sores. Follow these tips:    Do not kiss others if you have a cold sore. Also avoid kissing someone with a cold sore.    Do not share eating utensils, lip balm, razors, or towels with someone who has a cold sore.    Wash your hands after touching the area of a cold sore. The herpes virus can be carried from your face to your hands when you touch the area of a cold sore. When this happens, wash your hands thoroughly, for at least 20 seconds. When you can t wash with soap and water, use an alcohol-based hand .    Disinfect things you touch often, such as phones and keyboards.      If you feel a cold sore coming on, do the same things you would do when a cold sore is present to avoid spreading the virus.    Use condoms to help prevent passing on the viruses through sex.  What are the possible complications of a cold sore?  Cold sores usually go away by themselves within a few days, occasionally 1-2 weeks or longer. For most people cold sores are not serious. The viruses that cause cold sores can cause more serious illness, though. People who have a weak immune system may get more serious infections from herpes viruses. These include people being treated for cancer or who have HIV disease. Babies may also become very ill from a herpes infection.   When should I call my healthcare provider?  Call your healthcare provider right away if you have any of these:    Fever of 100.4 F (38 C) or higher, or as directed    Pain that gets worse    You cannot eat or drink because of painful sores    Symptoms don t get better within 5 to 7 days    Blisters spread beyond the mouth or lip to areas on the chest, arms, face (especially the eyes), or legs  Date Last Reviewed: 3/28/2016    3886-3629 [employer]. [address] [facility], [place] [address]. All rights reserved. This information is not intended as a substitute for  professional medical care. Always follow your healthcare professional's instructions.               Return in about 2 months (around 12/21/2020) for if with any concern about your medications..    Roshan Terrazas MD  [facility]

## 2020-10-21 NOTE — ANESTHESIA PREPROCEDURE EVALUATION
Anesthesia Pre-Procedure Evaluation    Patient: Yayo Culver   MRN: [id number] : 1960          Preoperative Diagnosis: Special screening for malignant neoplasms, colon [Z12.11]    Procedure(s):  COLONOSCOPY    No past medical history on file.  No past surgical history on file.    Anesthesia Evaluation     . Pt has had prior anesthetic. Type: General    No history of anesthetic complications          ROS/MED HX    ENT/Pulmonary:       Neurologic:  - neg neurologic ROS     Cardiovascular:  - neg cardiovascular ROS       METS/Exercise Tolerance:  >4 METS   Hematologic:  - neg hematologic  ROS       Musculoskeletal:   (+)  other musculoskeletal- chronic right shoulder pain      GI/Hepatic:  - neg GI/hepatic ROS       Renal/Genitourinary: Comment: ED        Endo:  - neg endo ROS       Psychiatric:  - neg psychiatric ROS       Infectious Disease:  - neg infectious disease ROS       Malignancy:      - no malignancy   Other:    - neg other ROS                      Physical Exam  Normal systems: cardiovascular, pulmonary and dental    Airway   Mallampati: I  TM distance: >3 FB  Neck ROM: full    Dental     Cardiovascular   Rhythm and rate: regular and normal      Pulmonary    breath sounds clear to auscultation            Lab Results   Component Value Date    WBC 6.1 09/15/2020    HGB 15.8 09/15/2020    HCT 46.4 09/15/2020     09/15/2020     09/15/2020    POTASSIUM 4.2 09/15/2020    CHLORIDE 107 09/15/2020    CO2 29 09/15/2020    BUN 11 09/15/2020    CR 0.98 09/15/2020     (H) 09/15/2020    ERNESTINE 9.0 09/15/2020    ALBUMIN 3.5 09/15/2020    PROTTOTAL 7.0 09/15/2020    ALT 27 09/15/2020    AST 20 09/15/2020    ALKPHOS 67 09/15/2020    BILITOTAL 0.3 09/15/2020    AMYLASE 56 2004       Preop Vitals  BP Readings from Last 3 Encounters:   10/21/20 114/74   10/20/20 113/67   09/15/20 106/74    Pulse Readings from Last 3 Encounters:   10/21/20 68   09/15/20 59      Resp Readings from Last 3  "Encounters:   10/21/20 16   09/15/20 14    SpO2 Readings from Last 3 Encounters:   10/21/20 98%   09/15/20 98%      Temp Readings from Last 1 Encounters:   10/21/20 36  C (96.8  F) (Tympanic)    Ht Readings from Last 1 Encounters:   10/21/20 1.753 m (5' 9\")      Wt Readings from Last 1 Encounters:   10/21/20 81.3 kg (179 lb 3.2 oz)    Estimated body mass index is 26.46 kg/m  as calculated from the following:    Height as of 10/21/20: 1.753 m (5' 9\").    Weight as of 10/21/20: 81.3 kg (179 lb 3.2 oz).       Anesthesia Plan      History & Physical Review  History and physical reviewed and following examination; no interval change.    ASA Status:  1 .    NPO Status:  > 8 hours    Plan for General with Propofol induction. Maintenance will be TIVA.             Postoperative Care      Consents  Anesthetic plan, risks, benefits and alternatives discussed with:  Patient..                 JASMYN Hayes CRNA  "

## 2020-10-22 ENCOUNTER — NURSE TRIAGE (OUTPATIENT)
Dept: NURSING | Facility: CLINIC | Age: 60
End: 2020-10-22

## 2020-10-22 LAB
C TRACH DNA SPEC QL NAA+PROBE: NEGATIVE
HBV SURFACE AG SERPL QL IA: NONREACTIVE
HIV 1+2 AB+HIV1 P24 AG SERPL QL IA: NONREACTIVE
N GONORRHOEA DNA SPEC QL NAA+PROBE: NEGATIVE
SPECIMEN SOURCE: NORMAL
SPECIMEN SOURCE: NORMAL
T PALLIDUM AB SER QL: NONREACTIVE

## 2020-10-22 NOTE — TELEPHONE ENCOUNTER
"Triage Call:   Pt calling about the instruction on his colonoscopy prep. Pt wanted to make sure that a 8.3 bottle of mirlax goes in to the 64 oz of Gatorade. Pt was unsure but continued to read the instructions that gave dosage of a 8.3 bottle in to the 64 oz of Gatorade. Pt will call back if he has any more questions or concerns.       Zenia Moncada RN Nursing Advisor 10/22/2020 6:14 PM     Additional Information    Negative: Drug overdose and nurse unable to answer question    Negative: Caller requesting information not related to medicine    Negative: Caller requesting a prescription for Strep throat and has a positive culture result    Negative: Rash while taking a medication or within 3 days of stopping it    Negative: Immunization reaction suspected    Negative: [1] Asthma AND [2] having symptoms of asthma (cough, wheezing, etc)    Negative: MORE THAN A DOUBLE DOSE of a prescription or over-the-counter (OTC) drug    Negative: [1] DOUBLE DOSE (an extra dose or lesser amount) of over-the-counter (OTC) drug AND [2] any symptoms (e.g., dizziness, nausea, pain, sleepiness)    Negative: [1] DOUBLE DOSE (an extra dose or lesser amount) of prescription drug AND [2] any symptoms (e.g., dizziness, nausea, pain, sleepiness)    Negative: Took another person's prescription drug    Negative: [1] DOUBLE DOSE (an extra dose or lesser amount) of prescription drug AND [2] NO symptoms (Exception: a double dose of antibiotics)    Negative: Diabetes drug error or overdose (e.g., insulin or extra dose)    Negative: [1] Request for URGENT new prescription or refill of \"essential\" medication (i.e., likelihood of harm to patient if not taken) AND [2] triager unable to fill per unit policy    Negative: [1] Prescription not at pharmacy AND [2] was prescribed today by PCP    Negative: Pharmacy calling with prescription questions and triager unable to answer question    Negative: Caller has urgent medication question about med that PCP " prescribed and triager unable to answer question    Negative: Caller has NON-URGENT medication question about med that PCP prescribed and triager unable to answer question    Negative: Caller requesting a NON-URGENT new prescription or refill and triager unable to refill per unit policy    Negative: Caller has medication question about med not prescribed by PCP and triager unable to answer question (e.g., compatibility with other med, storage)    Negative: [1] DOUBLE DOSE (an extra dose or lesser amount) of over-the-counter (OTC) drug AND [2] NO symptoms    Negative: [1] DOUBLE DOSE (an extra dose or lesser amount) of antibiotic drug AND [2] NO symptoms    Caller has medication question only, adult not sick, and triager answers question    Protocols used: MEDICATION QUESTION CALL-A-

## 2020-10-23 ENCOUNTER — HOSPITAL ENCOUNTER (OUTPATIENT)
Facility: CLINIC | Age: 60
Discharge: HOME | End: 2020-10-23
Attending: SURGERY | Admitting: SURGERY
Payer: COMMERCIAL

## 2020-10-23 ENCOUNTER — ANESTHESIA (OUTPATIENT)
Dept: GASTROENTEROLOGY | Facility: CLINIC | Age: 60
End: 2020-10-23
Payer: COMMERCIAL

## 2020-10-23 VITALS
SYSTOLIC BLOOD PRESSURE: 106 MMHG | OXYGEN SATURATION: 97 % | HEART RATE: 67 BPM | TEMPERATURE: 97.6 F | RESPIRATION RATE: 20 BRPM | DIASTOLIC BLOOD PRESSURE: 72 MMHG

## 2020-10-23 LAB
COLONOSCOPY: NORMAL
SHBG SERPL-SCNC: 29 NMOL/L (ref 11–80)
TESTOST FREE SERPL-MCNC: 6.28 NG/DL (ref 4.7–24.4)
TESTOST SERPL-MCNC: 297 NG/DL (ref 240–950)

## 2020-10-23 PROCEDURE — G0121 COLON CA SCRN NOT HI RSK IND: HCPCS | Performed by: SURGERY

## 2020-10-23 PROCEDURE — 250N000009 HC RX 250: Performed by: SURGERY

## 2020-10-23 PROCEDURE — 250N000009 HC RX 250: Performed by: NURSE ANESTHETIST, CERTIFIED REGISTERED

## 2020-10-23 PROCEDURE — 258N000003 HC RX IP 258 OP 636: Performed by: SURGERY

## 2020-10-23 PROCEDURE — 250N000011 HC RX IP 250 OP 636: Performed by: NURSE ANESTHETIST, CERTIFIED REGISTERED

## 2020-10-23 PROCEDURE — 45378 DIAGNOSTIC COLONOSCOPY: CPT | Performed by: SURGERY

## 2020-10-23 PROCEDURE — 370N000001 HC ANESTHESIA TECHNICAL FEE, 1ST 30 MIN: Performed by: SURGERY

## 2020-10-23 RX ORDER — NALOXONE HYDROCHLORIDE 0.4 MG/ML
.1-.4 INJECTION, SOLUTION INTRAMUSCULAR; INTRAVENOUS; SUBCUTANEOUS
Status: DISCONTINUED | OUTPATIENT
Start: 2020-10-23 | End: 2020-10-23 | Stop reason: HOSPADM

## 2020-10-23 RX ORDER — LIDOCAINE 40 MG/G
CREAM TOPICAL
Status: DISCONTINUED | OUTPATIENT
Start: 2020-10-23 | End: 2020-10-23 | Stop reason: HOSPADM

## 2020-10-23 RX ORDER — SODIUM CHLORIDE, SODIUM LACTATE, POTASSIUM CHLORIDE, CALCIUM CHLORIDE 600; 310; 30; 20 MG/100ML; MG/100ML; MG/100ML; MG/100ML
INJECTION, SOLUTION INTRAVENOUS CONTINUOUS
Status: DISCONTINUED | OUTPATIENT
Start: 2020-10-23 | End: 2020-10-23 | Stop reason: HOSPADM

## 2020-10-23 RX ORDER — ONDANSETRON 2 MG/ML
4 INJECTION INTRAMUSCULAR; INTRAVENOUS
Status: DISCONTINUED | OUTPATIENT
Start: 2020-10-23 | End: 2020-10-23 | Stop reason: HOSPADM

## 2020-10-23 RX ORDER — PROPOFOL 10 MG/ML
INJECTION, EMULSION INTRAVENOUS CONTINUOUS PRN
Status: DISCONTINUED | OUTPATIENT
Start: 2020-10-23 | End: 2020-10-23

## 2020-10-23 RX ORDER — GLYCOPYRROLATE 0.2 MG/ML
INJECTION, SOLUTION INTRAMUSCULAR; INTRAVENOUS PRN
Status: DISCONTINUED | OUTPATIENT
Start: 2020-10-23 | End: 2020-10-23

## 2020-10-23 RX ORDER — PROPOFOL 10 MG/ML
INJECTION, EMULSION INTRAVENOUS PRN
Status: DISCONTINUED | OUTPATIENT
Start: 2020-10-23 | End: 2020-10-23

## 2020-10-23 RX ORDER — FLUMAZENIL 0.1 MG/ML
0.2 INJECTION, SOLUTION INTRAVENOUS
Status: DISCONTINUED | OUTPATIENT
Start: 2020-10-23 | End: 2020-10-23 | Stop reason: HOSPADM

## 2020-10-23 RX ORDER — LIDOCAINE HYDROCHLORIDE 10 MG/ML
INJECTION, SOLUTION INFILTRATION; PERINEURAL PRN
Status: DISCONTINUED | OUTPATIENT
Start: 2020-10-23 | End: 2020-10-23

## 2020-10-23 RX ADMIN — GLYCOPYRROLATE 0.2 MG: 0.2 INJECTION, SOLUTION INTRAMUSCULAR; INTRAVENOUS at 07:37

## 2020-10-23 RX ADMIN — LIDOCAINE HYDROCHLORIDE 40 MG: 10 INJECTION, SOLUTION INFILTRATION; PERINEURAL at 07:37

## 2020-10-23 RX ADMIN — LIDOCAINE HYDROCHLORIDE 1 ML: 10 INJECTION, SOLUTION EPIDURAL; INFILTRATION; INTRACAUDAL; PERINEURAL at 06:55

## 2020-10-23 RX ADMIN — PROPOFOL 100 MG: 10 INJECTION, EMULSION INTRAVENOUS at 07:37

## 2020-10-23 RX ADMIN — SODIUM CHLORIDE, POTASSIUM CHLORIDE, SODIUM LACTATE AND CALCIUM CHLORIDE: 600; 310; 30; 20 INJECTION, SOLUTION INTRAVENOUS at 06:55

## 2020-10-23 RX ADMIN — PROPOFOL 200 MCG/KG/MIN: 10 INJECTION, EMULSION INTRAVENOUS at 07:37

## 2020-10-23 NOTE — H&P
60 year old year old male here for colonoscopy for screening.  Last colonoscopy was 11 years ago.  Patient denies blood in stool or change in stool caliber.  There is no known family history of colon cancer or polyps.    Patient Active Problem List   Diagnosis     Chronic right shoulder pain     Refused influenza vaccine     Advanced directives, counseling/discussion     Erectile dysfunction, unspecified erectile dysfunction type     Family history of prostate cancer       History reviewed. No pertinent past medical history.    History reviewed. No pertinent surgical history.    Family History   Problem Relation Age of Onset     Heart Disease Mother         a-fib     Kidney failure Mother      Heart Disease Father        No current outpatient medications on file.       No Known Allergies    Pt reports that he has never smoked. He has never used smokeless tobacco. He reports current alcohol use. He reports that he does not use drugs.    Exam:  /79   Pulse 61   Temp 97.6  F (36.4  C) (Oral)   Resp 12   SpO2 98%     Awake, Alert OX3  Lungs - CTA bilaterally  CV - RRR, no murmurs, distal pulses intact  Abd - soft, non-distended, non-tender, +BS  Extr - No cyanosis or edema    A/P 60 year old year old male in need of colonoscopy for screening. Risks, benefits, alternatives, and complications were discussed including the possibility of perforation, bleeding, missed lesion and the patient agreed to proceed    Brett Kahn DO on 10/23/2020 at 7:32 AM

## 2020-10-23 NOTE — ANESTHESIA POSTPROCEDURE EVALUATION
Patient: Yayo Culver    Procedure(s):  COLONOSCOPY    Diagnosis:Special screening for malignant neoplasms, colon [Z12.11]  Diagnosis Additional Information: No value filed.    Anesthesia Type:  General    Note:  Anesthesia Post Evaluation    Patient location during evaluation: Phase 2 and Bedside  Patient participation: Able to fully participate in evaluation  Level of consciousness: awake and alert  Pain management: adequate  Airway patency: patent  Cardiovascular status: acceptable  Respiratory status: acceptable  Hydration status: acceptable  PONV: none     Anesthetic complications: None          Last vitals:  Vitals:    10/23/20 0638 10/23/20 0755   BP: 112/79 92/52   Pulse: 61 64   Resp: 12 20   Temp: 36.4  C (97.6  F)    SpO2: 98% 98%         Electronically Signed By: Nick Lozoya CRNA, APRN CRNA  October 23, 2020  8:28 AM

## 2020-10-23 NOTE — ANESTHESIA CARE TRANSFER NOTE
Patient: Yayo Culver    Procedure(s):  COLONOSCOPY    Diagnosis: Special screening for malignant neoplasms, colon [Z12.11]  Diagnosis Additional Information: No value filed.    Anesthesia Type:   General     Note:  Airway :Nasal Cannula  Patient transferred to:Phase II  Handoff Report: Identifed the Patient, Identified the Reponsible Provider, Reviewed the pertinent medical history, Discussed the surgical course, Reviewed Intra-OP anesthesia mangement and issues during anesthesia, Set expectations for post-procedure period and Allowed opportunity for questions and acknowledgement of understanding      Vitals: (Last set prior to Anesthesia Care Transfer)    CRNA VITALS  10/23/2020 0726 - 10/23/2020 0757      10/23/2020             Pulse:  64    Ht Rate:  57    SpO2:  99 %                Electronically Signed By: Nick Lozoya CRNA, APRN CRNA  October 23, 2020  7:57 AM

## 2020-10-30 ENCOUNTER — HOSPITAL ENCOUNTER (OUTPATIENT)
Dept: PHYSICAL THERAPY | Facility: CLINIC | Age: 60
Setting detail: THERAPIES SERIES
End: 2020-10-30
Attending: FAMILY MEDICINE
Payer: COMMERCIAL

## 2020-10-30 PROCEDURE — 97110 THERAPEUTIC EXERCISES: CPT | Mod: GP | Performed by: PHYSICAL THERAPIST

## 2020-10-30 PROCEDURE — 97161 PT EVAL LOW COMPLEX 20 MIN: CPT | Mod: GP | Performed by: PHYSICAL THERAPIST

## 2020-10-30 NOTE — PROGRESS NOTES
10/30/20 1600   General Information   Type of Visit Initial OP Ortho PT Evaluation   Start of Care Date 10/30/20   Referring Physician José Poole, DO   Patient/Family Goals Statement to get some strength back and improve ROM   Orders Evaluate and Treat   Date of Order 10/20/20   Certification Required? Yes   Medical Diagnosis chronic R shoulder pain, RC tendinitis, scapulothoracic syndrome   Body Part(s)   Body Part(s) Shoulder   Presentation and Etiology   Pertinent history of current problem (include personal factors and/or comorbidities that impact the POC) Pt reports chronic R shoulder pain X 15 - 20 years. Pt states he was doing heavier yardwork which aggravated R shoulder.   Pt notes post shoulder pain/ UT and anterior shoulder pain and tightness .  Pain @ best 2/10, @ worst 5/10.  Pt also reports neck pain..   Pt notes w/ bike riding tingling/ numbness in R> L hand.   MRI in chart: mild infraspinatus tear, AC DJD.   Meds: none.  No injections.   Pt is R dominant.   Pt states he sees chiro 1X/ 2 months.   PMHX:  chronic intermittent R shoulder pain.  Mod: activity level   Impairments A. Pain;D. Decreased ROM;E. Decreased flexibility;F. Decreased strength and endurance   Onset date of current episode/exacerbation 10/14/20   Pain quality B. Dull;C. Aching;G. Cramping   Pain exacerbation comment heavy lifting 50-75#,  repetitive arm work such as putting gutters on,   Reaching behind back.  Avoids R sidelying for years   Pain/symptoms eased by C. Rest;E. Changing positions;G. Heat;H. Cold   Progression of symptoms since onset: Improved   Prior Level of Function   Functional Level Prior Comment mountain biking, kayaking, hiking, yardwork, X country skiing , snow shoeing, yoga   Current Level of Function   Patient role/employment history A. Employed   Employment Comments : just started a new job   Fall Risk Screen   Fall screen completed by PT   Have you fallen 2 or more times in the  past year? No   Have you fallen and had an injury in the past year? No   Is patient a fall risk? No   Abuse Screen (yes response referral indicated)   Feels Unsafe at Home or Work/School no   Feels Threatened by Someone no   Does Anyone Try to Keep You From Having Contact with Others or Doing Things Outside Your Home? no   Shoulder Objective Findings   Side (if bilateral, select both right and left) Right   Posture mild FH, slight rounded shoulders   Cervical Screen (ROM, quadrant) flex WFL, ext 75%, rot 90% B--guarded and pt reports stiffness   Scapulothoracic Rhythm mild decreased scap control B   Neer's Test neg   Allen-Fab Test +   Coracoid Test +   [place]'s Test neg   Crossover Test + for tightness   Shoulder Special Tests Comments Empty can/ lift off test neg   Palpation Tightness R UT/ levator/ rhomboid.   No palpable pain   Accessory Motion/Joint Mobility Hypomobile post capsule   Right Shoulder Flexion AROM R 157*, L 160*   Right Shoulder Abduction AROM WNL   Right Shoulder Abduction PROM WNL   Right Shoulder ER AROM WNL   Right Shoulder ER PROM WNL   Right Shoulder IR AROM to T9   Right Shoulder IR PROM 65*   Right Shoulder Flexion Strength 5/5   Right Shoulder Abduction Strength 5/5   Right Shoulder ER Strength 5/5   Right Shoulder IR Strength 5/5   Right Mid Trapezius Strength 5-/5 B    Right Lower Trapezius Strength R 3/5, L 4-/5   Planned Therapy Interventions   Planned Therapy Interventions manual therapy;strengthening;stretching  (posture)   Clinical Impression   Criteria for Skilled Therapeutic Interventions Met yes, treatment indicated   PT Diagnosis chronic R shoulder pain, cervical tightness,     Influenced by the following impairments pain, stiffness, decreased scap control   Functional limitations due to impairments reaching behind back, heavy lifting,  repetitive UE use, R sidelying   Clinical Presentation Stable/Uncomplicated   Clinical Presentation Rationale pt is improving from  recent flareup   Clinical Decision Making (Complexity) Low complexity   Therapy Frequency 1 time/week   Predicted Duration of Therapy Intervention (days/wks) 3 weeks then 1 X in 2 weeks = 4 visits   Risk & Benefits of therapy have been explained Yes   Patient, Family & other staff in agreement with plan of care Yes   Education Assessment   Barriers to Learning No barriers   Ortho Goal 1   Goal Identifier 1   Goal Description Pt will be able to reach behind back w/ pain no > 2/10   Target Date 12/14/20   Ortho Goal 2   Goal Identifier 2.   Goal Description Pt will be able to lie on R side X 20 min    Target Date 12/14/20   Ortho Goal 3   Goal Identifier 3   Goal Description Pt will be independent and consistent w/HEP   Target Date 12/14/20   Total Evaluation Time   PT Eval, Low Complexity Minutes (44058) 25   Therapy Certification   Certification date from 10/30/20   Certification date to 12/14/20   Medical Diagnosis chronic R shoulder pain, RC tendinitis, scapulothoracic syndrome     Thank you for this referral,    Nova Gamez, PT,   #[id number]  [facility] [place] [facility]  [phone]

## 2020-10-30 NOTE — PROGRESS NOTES
[facility]          OUTPATIENT PHYSICAL THERAPY ORTHOPEDIC EVALUATION  PLAN OF TREATMENT FOR OUTPATIENT REHABILITATION  (COMPLETE FOR INITIAL CLAIMS ONLY)  Patient's Last Name, First Name, M.I.  YOB: 1960  Yayo Culver    Provider s Name:  [facility]   Medical Record No.  [id number]   Start of Care Date:  10/30/20   Onset Date:  10/14/20   Type:     _X__PT   ___OT   ___SLP Medical Diagnosis:  chronic R shoulder pain, RC tendinitis, scapulothoracic syndrome     PT Diagnosis:  chronic R shoulder pain, cervical tightness,     Visits from SOC:  1      _________________________________________________________________________________  Plan of Treatment/Functional Goals:  manual therapy, strengthening, stretching(posture)       Goals  Goal Identifier: 1  Goal Description: Pt will be able to reach behind back w/ pain no > 2/10  Target Date: 12/14/20    Goal Identifier: 2.  Goal Description: Pt will be able to lie on R side X 20 min   Target Date: 12/14/20    Goal Identifier: 3  Goal Description: Pt will be independent and consistent w/HEP  Target Date: 12/14/20          Therapy Frequency:  1 time/week  Predicted Duration of Therapy Intervention:  3 weeks then 1 X in 2 weeks = 4 visits    Nova Gamez, PT                 I CERTIFY THE NEED FOR THESE SERVICES FURNISHED UNDER        THIS PLAN OF TREATMENT AND WHILE UNDER MY CARE     (Physician co-signature of this document indicates review and certification of the therapy plan).                       Certification Date From:  10/30/20   Certification Date To:  12/14/20    Referring Provider:  José Poole,     Initial Assessment        See Epic Evaluation Start of Care Date: 10/30/20

## 2020-11-16 ENCOUNTER — HEALTH MAINTENANCE LETTER (OUTPATIENT)
Age: 60
End: 2020-11-16

## 2020-12-16 NOTE — PROGRESS NOTES
Discharge Note -Physical Therapy    NAME:  Yayo Culver  MRN:   [id number]    S:    Pt did not follow up for therapy as recommended. Pt cancelled appts on 11/11/20 and 11/20/20 and did not reschedule    O:  Objective information is not available as pt has not returned for therapy.  Initial evaluation will serve as final entry.    A:   Pt did not return for further treatment.    Status of goals is unknown due to lack of followup by patient.    P:  Discharge from PT this date.    Thank you for this referral,    Nova Gamez, PT,   #[id number]  [facility]  [phone]

## 2021-06-03 DIAGNOSIS — N52.9 ERECTILE DYSFUNCTION, UNSPECIFIED ERECTILE DYSFUNCTION TYPE: ICD-10-CM

## 2021-06-03 RX ORDER — SILDENAFIL CITRATE 20 MG/1
TABLET ORAL
Qty: 30 TABLET | Refills: 1 | Status: SHIPPED | OUTPATIENT
Start: 2021-06-03

## 2021-09-12 ENCOUNTER — HEALTH MAINTENANCE LETTER (OUTPATIENT)
Age: 61
End: 2021-09-12

## 2021-11-07 ENCOUNTER — HEALTH MAINTENANCE LETTER (OUTPATIENT)
Age: 61
End: 2021-11-07

## 2022-03-16 DIAGNOSIS — B00.1 RECURRENT COLD SORES: ICD-10-CM

## 2022-03-17 RX ORDER — VALACYCLOVIR HYDROCHLORIDE 1 G/1
TABLET, FILM COATED ORAL
Qty: 4 TABLET | Refills: 0 | Status: SHIPPED | OUTPATIENT
Start: 2022-03-17

## 2022-03-17 NOTE — TELEPHONE ENCOUNTER
Routing refill request to provider for review/approval because:  Labs not current:  cr 0.98  9/15/20  LOV 10/12/2020    Pending Prescriptions:                       Disp   Refills    valACYclovir (VALTREX) 1000 mg tablet [Pha*4 tabl*11       Sig: TAKE TWO TABLETS BY MOUTH TWICE A DAY FOR 1 DAY     Antivirals for Herpes Protocol Failed 03/16/2022 11:37 AM   Protocol Details  Recent (12 mo) or future (30 days) visit within the authorizing provider's specialty    Normal serum creatinine on file in past 12 months    Patient is age 12 or older    Medication is active on med list        Diane White RN on 3/17/2022 at 11:33 AM

## 2022-11-19 ENCOUNTER — HEALTH MAINTENANCE LETTER (OUTPATIENT)
Age: 62
End: 2022-11-19

## 2024-01-28 ENCOUNTER — HEALTH MAINTENANCE LETTER (OUTPATIENT)
Age: 64
End: 2024-01-28

## 2024-06-17 PROBLEM — Z71.89 ADVANCED DIRECTIVES, COUNSELING/DISCUSSION: Status: RESOLVED | Noted: 2020-09-15 | Resolved: 2024-06-17

## (undated) RX ORDER — GLYCOPYRROLATE 0.2 MG/ML
INJECTION, SOLUTION INTRAMUSCULAR; INTRAVENOUS
Status: DISPENSED
Start: 2020-10-23

## (undated) RX ORDER — LIDOCAINE HYDROCHLORIDE 10 MG/ML
INJECTION, SOLUTION EPIDURAL; INFILTRATION; INTRACAUDAL; PERINEURAL
Status: DISPENSED
Start: 2020-10-23

## (undated) RX ORDER — PROPOFOL 10 MG/ML
INJECTION, EMULSION INTRAVENOUS
Status: DISPENSED
Start: 2020-10-23